# Patient Record
Sex: FEMALE | Race: WHITE | NOT HISPANIC OR LATINO | URBAN - METROPOLITAN AREA
[De-identification: names, ages, dates, MRNs, and addresses within clinical notes are randomized per-mention and may not be internally consistent; named-entity substitution may affect disease eponyms.]

---

## 2017-01-29 ENCOUNTER — EMERGENCY (EMERGENCY)
Facility: HOSPITAL | Age: 82
LOS: 1 days | Discharge: HOME | End: 2017-01-29
Attending: EMERGENCY MEDICINE
Payer: MEDICARE

## 2017-01-29 VITALS
SYSTOLIC BLOOD PRESSURE: 144 MMHG | DIASTOLIC BLOOD PRESSURE: 67 MMHG | WEIGHT: 111.99 LBS | HEART RATE: 102 BPM | OXYGEN SATURATION: 93 % | RESPIRATION RATE: 24 BRPM

## 2017-01-29 VITALS
TEMPERATURE: 98 F | SYSTOLIC BLOOD PRESSURE: 150 MMHG | DIASTOLIC BLOOD PRESSURE: 62 MMHG | RESPIRATION RATE: 22 BRPM | OXYGEN SATURATION: 92 % | HEART RATE: 85 BPM

## 2017-01-29 DIAGNOSIS — R05 COUGH: ICD-10-CM

## 2017-01-29 DIAGNOSIS — R06.00 DYSPNEA, UNSPECIFIED: ICD-10-CM

## 2017-01-29 LAB
ANION GAP SERPL CALC-SCNC: 10 MMOL/L — SIGNIFICANT CHANGE UP (ref 5–17)
BASOPHILS # BLD AUTO: 0 K/UL — SIGNIFICANT CHANGE UP (ref 0–0.2)
BASOPHILS NFR BLD AUTO: 0.3 % — SIGNIFICANT CHANGE UP (ref 0–2)
BUN SERPL-MCNC: 12 MG/DL — SIGNIFICANT CHANGE UP (ref 7–18)
CALCIUM SERPL-MCNC: 8.6 MG/DL — SIGNIFICANT CHANGE UP (ref 8.4–10.5)
CHLORIDE SERPL-SCNC: 98 MMOL/L — SIGNIFICANT CHANGE UP (ref 96–108)
CK SERPL-CCNC: 73 U/L — SIGNIFICANT CHANGE UP (ref 21–215)
CO2 SERPL-SCNC: 27 MMOL/L — SIGNIFICANT CHANGE UP (ref 22–31)
CREAT SERPL-MCNC: 0.97 MG/DL — SIGNIFICANT CHANGE UP (ref 0.5–1.3)
D DIMER BLD IA.RAPID-MCNC: 280 NG/ML DDU — HIGH
EOSINOPHIL # BLD AUTO: 0 K/UL — SIGNIFICANT CHANGE UP (ref 0–0.5)
EOSINOPHIL NFR BLD AUTO: 0 % — SIGNIFICANT CHANGE UP (ref 0–6)
GLUCOSE SERPL-MCNC: 142 MG/DL — HIGH (ref 70–99)
HCT VFR BLD CALC: 45.6 % — HIGH (ref 34.5–45)
HGB BLD-MCNC: 15.2 G/DL — SIGNIFICANT CHANGE UP (ref 11.5–15.5)
LYMPHOCYTES # BLD AUTO: 1.3 K/UL — SIGNIFICANT CHANGE UP (ref 1–3.3)
LYMPHOCYTES # BLD AUTO: 10.2 % — LOW (ref 13–44)
MAGNESIUM SERPL-MCNC: 2.1 MG/DL — SIGNIFICANT CHANGE UP (ref 1.8–2.4)
MCHC RBC-ENTMCNC: 30.7 PG — SIGNIFICANT CHANGE UP (ref 27–34)
MCHC RBC-ENTMCNC: 33.2 GM/DL — SIGNIFICANT CHANGE UP (ref 32–36)
MCV RBC AUTO: 92.5 FL — SIGNIFICANT CHANGE UP (ref 80–100)
MONOCYTES # BLD AUTO: 1 K/UL — HIGH (ref 0–0.9)
MONOCYTES NFR BLD AUTO: 8 % — SIGNIFICANT CHANGE UP (ref 2–14)
NEUTROPHILS # BLD AUTO: 10.6 K/UL — HIGH (ref 1.8–7.4)
NEUTROPHILS NFR BLD AUTO: 81.5 % — HIGH (ref 43–77)
NT-PROBNP SERPL-SCNC: 2255 PG/ML — HIGH (ref 0–450)
PLATELET # BLD AUTO: 243 K/UL — SIGNIFICANT CHANGE UP (ref 150–400)
POTASSIUM SERPL-MCNC: 3.7 MMOL/L — SIGNIFICANT CHANGE UP (ref 3.5–5.3)
POTASSIUM SERPL-SCNC: 3.7 MMOL/L — SIGNIFICANT CHANGE UP (ref 3.5–5.3)
RBC # BLD: 4.93 M/UL — SIGNIFICANT CHANGE UP (ref 3.8–5.2)
RBC # FLD: 12.5 % — SIGNIFICANT CHANGE UP (ref 10.3–14.5)
SODIUM SERPL-SCNC: 135 MMOL/L — SIGNIFICANT CHANGE UP (ref 135–145)
TROPONIN I SERPL-MCNC: <0.015 NG/ML — SIGNIFICANT CHANGE UP (ref 0–0.04)
WBC # BLD: 13.1 K/UL — HIGH (ref 3.8–10.5)
WBC # FLD AUTO: 13.1 K/UL — HIGH (ref 3.8–10.5)

## 2017-01-29 PROCEDURE — 84484 ASSAY OF TROPONIN QUANT: CPT

## 2017-01-29 PROCEDURE — 71046 X-RAY EXAM CHEST 2 VIEWS: CPT

## 2017-01-29 PROCEDURE — 36000 PLACE NEEDLE IN VEIN: CPT

## 2017-01-29 PROCEDURE — 80048 BASIC METABOLIC PNL TOTAL CA: CPT

## 2017-01-29 PROCEDURE — 99284 EMERGENCY DEPT VISIT MOD MDM: CPT

## 2017-01-29 PROCEDURE — 83880 ASSAY OF NATRIURETIC PEPTIDE: CPT

## 2017-01-29 PROCEDURE — 85379 FIBRIN DEGRADATION QUANT: CPT

## 2017-01-29 PROCEDURE — 71020: CPT | Mod: 26

## 2017-01-29 PROCEDURE — 93005 ELECTROCARDIOGRAM TRACING: CPT

## 2017-01-29 PROCEDURE — 71275 CT ANGIOGRAPHY CHEST: CPT | Mod: 26

## 2017-01-29 PROCEDURE — 85027 COMPLETE CBC AUTOMATED: CPT

## 2017-01-29 PROCEDURE — 83735 ASSAY OF MAGNESIUM: CPT

## 2017-01-29 PROCEDURE — 71275 CT ANGIOGRAPHY CHEST: CPT

## 2017-01-29 PROCEDURE — 82550 ASSAY OF CK (CPK): CPT

## 2017-01-29 RX ORDER — AZITHROMYCIN 500 MG/1
1 TABLET, FILM COATED ORAL
Qty: 6 | Refills: 0 | OUTPATIENT
Start: 2017-01-29 | End: 2017-02-04

## 2017-01-29 NOTE — ED PROVIDER NOTE - CARE PLAN
Principal Discharge DX:	Cough Principal Discharge DX:	Cough  Secondary Diagnosis:	Dyspnea, unspecified type

## 2017-01-29 NOTE — ED ADULT NURSE NOTE - NS ED NURSE DC INFO COMPLEXITY
Verbalized Understanding/Patient asked questions/Simple: Patient demonstrates quick and easy understanding

## 2017-01-29 NOTE — ED PROVIDER NOTE - OBJECTIVE STATEMENT
82 y/o F no medical history former smoker presenting from urgent care for pulse ox 91-93% cough x 1 week. Afebrile, no chest pain, no dyspnea, no leg swelling.

## 2018-01-01 ENCOUNTER — EMERGENCY (EMERGENCY)
Facility: HOSPITAL | Age: 83
LOS: 1 days | End: 2018-01-01
Attending: EMERGENCY MEDICINE
Payer: MEDICARE

## 2018-01-01 VITALS — HEIGHT: 62 IN | WEIGHT: 110.01 LBS

## 2018-01-01 PROCEDURE — 99291 CRITICAL CARE FIRST HOUR: CPT

## 2018-01-01 PROCEDURE — 99291 CRITICAL CARE FIRST HOUR: CPT | Mod: 25

## 2018-01-01 PROCEDURE — 82962 GLUCOSE BLOOD TEST: CPT

## 2018-03-25 NOTE — ED PROVIDER NOTE - PHYSICAL EXAMINATION
General: pt lying in stretcher, cpr in progress on arrival, intubated, unresponsive   HEENT: AT, pale conjunctiva, anicteric sclerae, pupils nonreactive, dry mucus membranes, ET tube placement confirmed via glidescope  Neck: no midline stepoffs  Lungs: intubated and being bagged with b/l breath sounds auscultated  Heart: no pulses, asystole  Abd: soft, nondistended, no masses  Extremities: no clubbing, cyanosis, or edema; no palpable deformities or fractures  Skin: no rashes, ecchymoses, or jaundice  Pulses: absent  Neuro: unresponsive, intubated, cpr in progress

## 2018-03-25 NOTE — DISCHARGE NOTE FOR THE EXPIRED PATIENT - OTHER SIGNIFICANT FINDINGS
Organ Donation  notified of patient's expiration. Due to her age this patient is not a candidate for donation however [facility] will refer this pt. [facility].

## 2018-03-25 NOTE — ED PROVIDER NOTE - OBJECTIVE STATEMENT
81 y/o F pt BIB EMS (called by pt's ) s/p cardiac arrest (unknown down time); EMS arrived at 0133. Per EMS, initial rhythm asystole; pt intubated; CPR initiated; given epi x 3 and D50 x 1. EMS reports that pt went into PEA. EMS also reports that at one point, pt appeared to be breathing but never had a pulse. In ED, initial pulse VFib, no pulse appreciated, CPR continued, ET tube placement confirmed by glidescope; pt given 1 epi, no ROSC. Bedside cardiac US showed cardiac standstill. In ED, pt went into PEA. Total down: over an hour. 81 y/o F pt BIB EMS (called by pt's ) s/p cardiac arrest (unknown down time); EMS arrived at 0133. Per EMS, initial rhythm asystole; pt intubated; CPR initiated; given epi x 3 and D50 x 1. EMS reports that pt went into PEA. EMS also reports that at one point, pt appeared to be breathing but never had a pulse. In ED, initial pulse VFib, no pulse appreciated, CPR continued, ET tube placement confirmed by glidescope; pt given 1 epi, no ROSC. Bedside cardiac US showed cardiac standstill. In ED, pt went into PEA. Total down: over an hour.    As per patient's , she has no known PMH and was last evaluated in the hospital a year ago for shortness of breath but wasn't given any diagnosis. EMR review reveals pt was seen in the ED 1/2017 for dyspnea, workup and CT Scan revealed pulmonary nodules but no PNA or PE, patient was discharged home on antibiotics with instructions to follow up with her regular doctor. Tonight patient's  reports she was in her usual state of health when they went to sleep. He woke up to use the bathroom and upon returning to bed he heard her gurgling. She was not responding to him so he called 911. He was unable to position her properly to perform CPR and waited with her until EMS arrival. 81 y/o F pt BIB EMS (called by pt's ) s/p cardiac arrest (unknown down time); EMS arrived at 0133. Per EMS, initial rhythm asystole; pt intubated; CPR initiated; given epi x 3 and D50 x 1. EMS reports that pt went into PEA. EMS also reports that at one point, pt appeared to be breathing but never had a pulse. In ED, initial pulse VFib, no pulse appreciated, CPR continued, ET tube placement confirmed by glidescope; pt given 1 epi, no ROSC. Bedside cardiac US showed cardiac standstill. In ED, pt went into PEA.   As per patient's , she has no known PMH and was last evaluated in the hospital a year ago for shortness of breath but wasn't given any diagnosis. EMR review reveals pt was seen in the ED 1/2017 for dyspnea, workup and CT Scan revealed pulmonary nodules but no PNA or PE, patient was discharged home on antibiotics with instructions to follow up with her regular doctor. Tonight patient's  reports she was in her usual state of health when they went to sleep. He woke up to use the bathroom and upon returning to bed he heard her gurgling. She was not responding to him so he called 911. He was unable to position her properly to perform CPR and waited with her until EMS arrival.

## 2018-03-25 NOTE — ED PROVIDER NOTE - MEDICAL DECISION MAKING DETAILS
81 yo f bibems s/p cardiac arrest after being found unresponsive by  at unknown time with patient intubated in the field, CPR in progress almost hour and no ROSC. CPR continued on arrival. Bedside cardiac US reveals cardiac standstill. Pt pronounced dead.

## 2018-03-25 NOTE — ED ADULT NURSE NOTE - OBJECTIVE STATEMENT
pt arrived as active cardiac arrest, as per ems, they have been actively trying to resuscitate pt for almost 1 hour w/ no ROSC, pt was intubated on the field, airway confirmed by Dr. Rosales, chest compressions continued in ED and epinephrine 1gm IVP x 1 given. Ultrasound by Dr. Rosales confirmed cardiac standstill and pt pronounced at 0229. Pt was at bedside and was calm and accepting of pt's death

## 2018-03-25 NOTE — ED ADULT TRIAGE NOTE - CHIEF COMPLAINT QUOTE
NOTIFICATION FOR S/P CARDIAC ARREST ON SCENE AS PER EMS PATIENT WAS ASYSTOLI,BEGAN CPR,was given 3 ams epi and 1 amp D50,was shock total of 6x for v-fib f/up with pea ,intubated to a size 7.5 e-t tube,on arrival patient on active acls,Dr. ZENDEJAS AT BED SIDE

## 2018-03-25 NOTE — ED PROVIDER NOTE - CRITICAL CARE PROVIDED
consult w/ pt's family directly relating to pts condition/documentation/direct patient care (not related to procedure)

## 2020-02-26 NOTE — ED ADULT NURSE NOTE - NS PRO PASSIVE SMOKE EXP
[Alert] : alert [No Acute Distress] : no acute distress [Normal Sclera/Conjunctiva] : normal sclera/conjunctiva [EOMI] : extra ocular movement intact [Supple] : the neck was supple [Thyroid Not Enlarged] : the thyroid was not enlarged [No LAD] : no lymphadenopathy [Clear to Auscultation] : lungs were clear to auscultation bilaterally [No Accessory Muscle Use] : no accessory muscle use [Normal S1, S2] : normal S1 and S2 [Regular Rhythm] : with a regular rhythm [Normal Bowel Sounds] : normal bowel sounds [Pedal Pulses Normal] : the pedal pulses are present [Not Tender] : non-tender [Soft] : abdomen soft Unknown [Not Distended] : not distended [Normal] : normal and non tender [No Rash] : no rash [No Clubbing, Cyanosis] : no clubbing  or cyanosis of the fingernails [Right Foot Was Examined] : right foot ~C was examined [Left Foot Was Examined] : left foot ~C was examined [2+] : 2+ in the dorsalis pedis [Diminished Throughout Both Feet] : diminished tactile sensation with monofilament testing throughout both feet [Normal Affect] : the affect was normal [Normal Reflexes] : deep tendon reflexes were 2+ and symmetric [Normal Mood] : the mood was normal [FreeTextEntry1] : callus [de-identified] : b/l edema with varicose veins [FreeTextEntry2] : onychomycosis [FreeTextEntry5] : callus [FreeTextEntry6] : onychomycosis